# Patient Record
(demographics unavailable — no encounter records)

---

## 2025-06-26 NOTE — RESULTS/DATA
[TextEntry] : ECG: Normal sinus rhythm at 68 bpm.  OK = 2020 ms.  Left and hemiblock.  Poor R wave progression V1-V4.  No significant change.

## 2025-06-26 NOTE — CARDIOLOGY SUMMARY
[de-identified] : 12/7/2022.  Coronary calcium score equals 0.  Scattered minimal coronary stenosis secondary to noncalcified plaque.  Nonspecific dependent groundglass and mild linear scarring/atelectasis.

## 2025-06-26 NOTE — REASON FOR VISIT
[Syncope] : syncope [FreeTextEntry1] : dilated ascending aorta and aortic regurgitation, near-syncope

## 2025-06-26 NOTE — HISTORY OF PRESENT ILLNESS
[FreeTextEntry1] : Presents in scheduled annual follow-up reporting that she has been feeling very well.  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  Generally active but not exercising.  ADL including stair climbing are well-tolerated she is preparing to move and bending and lifting are also well-tolerated.  Upon initially arising from bed in the morning, she occasionally feels transient palpitations.  No c/o chest, throat,jaw, arm or upper back discomfort.  No dyspnea, orthopnea or PND.  No dizziness or syncope.  No edema or claudication.